# Patient Record
Sex: MALE | Race: BLACK OR AFRICAN AMERICAN | NOT HISPANIC OR LATINO | Employment: UNEMPLOYED | URBAN - METROPOLITAN AREA
[De-identification: names, ages, dates, MRNs, and addresses within clinical notes are randomized per-mention and may not be internally consistent; named-entity substitution may affect disease eponyms.]

---

## 2024-01-30 ENCOUNTER — APPOINTMENT (OUTPATIENT)
Dept: GENERAL RADIOLOGY | Facility: HOSPITAL | Age: 19
End: 2024-01-30
Payer: COMMERCIAL

## 2024-01-30 ENCOUNTER — HOSPITAL ENCOUNTER (EMERGENCY)
Facility: HOSPITAL | Age: 19
Discharge: HOME | End: 2024-01-30
Attending: EMERGENCY MEDICINE | Admitting: EMERGENCY MEDICINE
Payer: COMMERCIAL

## 2024-01-30 VITALS
HEART RATE: 105 BPM | OXYGEN SATURATION: 98 % | TEMPERATURE: 98.7 F | WEIGHT: 125 LBS | RESPIRATION RATE: 18 BRPM | BODY MASS INDEX: 17.9 KG/M2 | DIASTOLIC BLOOD PRESSURE: 67 MMHG | HEIGHT: 70 IN | SYSTOLIC BLOOD PRESSURE: 108 MMHG

## 2024-01-30 DIAGNOSIS — M79.671 BILATERAL FOOT PAIN: Primary | ICD-10-CM

## 2024-01-30 DIAGNOSIS — M79.672 BILATERAL FOOT PAIN: Primary | ICD-10-CM

## 2024-01-30 DIAGNOSIS — F31.9 BIPOLAR AFFECTIVE DISORDER, REMISSION STATUS UNSPECIFIED: ICD-10-CM

## 2024-01-30 LAB
ALBUMIN SERPL-MCNC: 4.1 G/DL (ref 3.5–5.2)
ALBUMIN/GLOB SERPL: 1.3 G/DL
ALP SERPL-CCNC: 60 U/L (ref 39–117)
ALT SERPL W P-5'-P-CCNC: 21 U/L (ref 1–41)
AMPHET+METHAMPHET UR QL: NEGATIVE
ANION GAP SERPL CALCULATED.3IONS-SCNC: 11.7 MMOL/L (ref 5–15)
AST SERPL-CCNC: 23 U/L (ref 1–40)
BARBITURATES UR QL SCN: NEGATIVE
BASOPHILS # BLD AUTO: 0.02 10*3/MM3 (ref 0–0.2)
BASOPHILS NFR BLD AUTO: 0.2 % (ref 0–1.5)
BENZODIAZ UR QL SCN: NEGATIVE
BILIRUB SERPL-MCNC: 0.3 MG/DL (ref 0–1.2)
BILIRUB UR QL STRIP: NEGATIVE
BUN SERPL-MCNC: 20 MG/DL (ref 6–20)
BUN/CREAT SERPL: 37.7 (ref 7–25)
CALCIUM SPEC-SCNC: 9.3 MG/DL (ref 8.6–10.5)
CANNABINOIDS SERPL QL: POSITIVE
CHLORIDE SERPL-SCNC: 99 MMOL/L (ref 98–107)
CK SERPL-CCNC: 158 U/L
CLARITY UR: ABNORMAL
CO2 SERPL-SCNC: 27.3 MMOL/L (ref 22–29)
COCAINE UR QL: NEGATIVE
COLOR UR: YELLOW
CREAT SERPL-MCNC: 0.53 MG/DL (ref 0.76–1.27)
DEPRECATED RDW RBC AUTO: 43.1 FL (ref 37–54)
EGFRCR SERPLBLD CKD-EPI 2021: 148.1 ML/MIN/1.73
EOSINOPHIL # BLD AUTO: 0.03 10*3/MM3 (ref 0–0.4)
EOSINOPHIL NFR BLD AUTO: 0.3 % (ref 0.3–6.2)
ERYTHROCYTE [DISTWIDTH] IN BLOOD BY AUTOMATED COUNT: 13.1 % (ref 12.3–15.4)
ETHANOL BLD-MCNC: <10 MG/DL (ref 0–10)
ETHANOL UR QL: <0.01 %
FENTANYL UR-MCNC: NEGATIVE NG/ML
GLOBULIN UR ELPH-MCNC: 3.2 GM/DL
GLUCOSE SERPL-MCNC: 93 MG/DL (ref 65–99)
GLUCOSE UR STRIP-MCNC: NEGATIVE MG/DL
HCT VFR BLD AUTO: 40.1 % (ref 37.5–51)
HGB BLD-MCNC: 13.4 G/DL (ref 13–17.7)
HGB UR QL STRIP.AUTO: NEGATIVE
IMM GRANULOCYTES # BLD AUTO: 0.13 10*3/MM3 (ref 0–0.05)
IMM GRANULOCYTES NFR BLD AUTO: 1.3 % (ref 0–0.5)
KETONES UR QL STRIP: NEGATIVE
LEUKOCYTE ESTERASE UR QL STRIP.AUTO: NEGATIVE
LYMPHOCYTES # BLD AUTO: 1.89 10*3/MM3 (ref 0.7–3.1)
LYMPHOCYTES NFR BLD AUTO: 18.4 % (ref 19.6–45.3)
MAGNESIUM SERPL-MCNC: 2.3 MG/DL (ref 1.7–2.2)
MCH RBC QN AUTO: 30 PG (ref 26.6–33)
MCHC RBC AUTO-ENTMCNC: 33.4 G/DL (ref 31.5–35.7)
MCV RBC AUTO: 89.9 FL (ref 79–97)
METHADONE UR QL SCN: NEGATIVE
MONOCYTES # BLD AUTO: 0.72 10*3/MM3 (ref 0.1–0.9)
MONOCYTES NFR BLD AUTO: 7 % (ref 5–12)
NEUTROPHILS NFR BLD AUTO: 7.49 10*3/MM3 (ref 1.7–7)
NEUTROPHILS NFR BLD AUTO: 72.8 % (ref 42.7–76)
NITRITE UR QL STRIP: NEGATIVE
NRBC BLD AUTO-RTO: 0 /100 WBC (ref 0–0.2)
NT-PROBNP SERPL-MCNC: 51.5 PG/ML (ref 0–450)
OPIATES UR QL: NEGATIVE
OXYCODONE UR QL SCN: NEGATIVE
PH UR STRIP.AUTO: 7 [PH] (ref 5–8)
PLATELET # BLD AUTO: 260 10*3/MM3 (ref 140–450)
PMV BLD AUTO: 8.1 FL (ref 6–12)
POTASSIUM SERPL-SCNC: 4.6 MMOL/L (ref 3.5–5.2)
PROT SERPL-MCNC: 7.3 G/DL (ref 6–8.5)
PROT UR QL STRIP: NEGATIVE
QT INTERVAL: 337 MS
QTC INTERVAL: 452 MS
RBC # BLD AUTO: 4.46 10*6/MM3 (ref 4.14–5.8)
SODIUM SERPL-SCNC: 138 MMOL/L (ref 136–145)
SP GR UR STRIP: 1.02 (ref 1–1.03)
UROBILINOGEN UR QL STRIP: ABNORMAL
VALPROATE SERPL-MCNC: 28 MCG/ML (ref 50–125)
WBC NRBC COR # BLD AUTO: 10.28 10*3/MM3 (ref 3.4–10.8)

## 2024-01-30 PROCEDURE — 80307 DRUG TEST PRSMV CHEM ANLYZR: CPT | Performed by: EMERGENCY MEDICINE

## 2024-01-30 PROCEDURE — 96375 TX/PRO/DX INJ NEW DRUG ADDON: CPT

## 2024-01-30 PROCEDURE — 82077 ASSAY SPEC XCP UR&BREATH IA: CPT | Performed by: EMERGENCY MEDICINE

## 2024-01-30 PROCEDURE — 71045 X-RAY EXAM CHEST 1 VIEW: CPT

## 2024-01-30 PROCEDURE — 25010000002 THIAMINE PER 100 MG: Performed by: EMERGENCY MEDICINE

## 2024-01-30 PROCEDURE — 99284 EMERGENCY DEPT VISIT MOD MDM: CPT

## 2024-01-30 PROCEDURE — 80053 COMPREHEN METABOLIC PANEL: CPT | Performed by: EMERGENCY MEDICINE

## 2024-01-30 PROCEDURE — 93005 ELECTROCARDIOGRAM TRACING: CPT | Performed by: EMERGENCY MEDICINE

## 2024-01-30 PROCEDURE — 82550 ASSAY OF CK (CPK): CPT | Performed by: EMERGENCY MEDICINE

## 2024-01-30 PROCEDURE — 96361 HYDRATE IV INFUSION ADD-ON: CPT

## 2024-01-30 PROCEDURE — 83880 ASSAY OF NATRIURETIC PEPTIDE: CPT | Performed by: EMERGENCY MEDICINE

## 2024-01-30 PROCEDURE — 83735 ASSAY OF MAGNESIUM: CPT | Performed by: EMERGENCY MEDICINE

## 2024-01-30 PROCEDURE — 96365 THER/PROPH/DIAG IV INF INIT: CPT

## 2024-01-30 PROCEDURE — 81003 URINALYSIS AUTO W/O SCOPE: CPT | Performed by: EMERGENCY MEDICINE

## 2024-01-30 PROCEDURE — 90791 PSYCH DIAGNOSTIC EVALUATION: CPT | Performed by: SOCIAL WORKER

## 2024-01-30 PROCEDURE — 80164 ASSAY DIPROPYLACETIC ACD TOT: CPT | Performed by: EMERGENCY MEDICINE

## 2024-01-30 PROCEDURE — 85025 COMPLETE CBC W/AUTO DIFF WBC: CPT | Performed by: EMERGENCY MEDICINE

## 2024-01-30 PROCEDURE — 25810000003 SODIUM CHLORIDE 0.9 % SOLUTION: Performed by: EMERGENCY MEDICINE

## 2024-01-30 RX ORDER — PALIPERIDONE 9 MG/1
9 TABLET, EXTENDED RELEASE ORAL EVERY EVENING
COMMUNITY

## 2024-01-30 RX ORDER — THIAMINE HYDROCHLORIDE 100 MG/ML
200 INJECTION, SOLUTION INTRAMUSCULAR; INTRAVENOUS ONCE
Status: COMPLETED | OUTPATIENT
Start: 2024-01-30 | End: 2024-01-30

## 2024-01-30 RX ORDER — METOPROLOL TARTRATE 50 MG/1
75 TABLET, FILM COATED ORAL 2 TIMES DAILY
COMMUNITY

## 2024-01-30 RX ORDER — SODIUM CHLORIDE 9 MG/ML
1000 INJECTION, SOLUTION INTRAVENOUS ONCE
Status: COMPLETED | OUTPATIENT
Start: 2024-01-30 | End: 2024-01-30

## 2024-01-30 RX ORDER — MULTIPLE VITAMINS W/ MINERALS TAB 9MG-400MCG
1 TAB ORAL ONCE
Status: COMPLETED | OUTPATIENT
Start: 2024-01-30 | End: 2024-01-30

## 2024-01-30 RX ORDER — DIVALPROEX SODIUM 500 MG/1
500 TABLET, DELAYED RELEASE ORAL 2 TIMES DAILY
COMMUNITY

## 2024-01-30 RX ORDER — PREDNISONE 5 MG/1
15 TABLET ORAL DAILY
COMMUNITY

## 2024-01-30 RX ORDER — CEPHALEXIN 500 MG/1
500 CAPSULE ORAL 2 TIMES DAILY
COMMUNITY

## 2024-01-30 RX ADMIN — THIAMINE HYDROCHLORIDE 200 MG: 100 INJECTION, SOLUTION INTRAMUSCULAR; INTRAVENOUS at 15:50

## 2024-01-30 RX ADMIN — FOLIC ACID 1 MG: 5 INJECTION, SOLUTION INTRAMUSCULAR; INTRAVENOUS; SUBCUTANEOUS at 15:54

## 2024-01-30 RX ADMIN — SODIUM CHLORIDE 1000 ML: 9 INJECTION, SOLUTION INTRAVENOUS at 15:50

## 2024-01-30 RX ADMIN — MULTIPLE VITAMINS W/ MINERALS TAB 1 TABLET: TAB at 15:50

## 2024-01-30 NOTE — ED NOTES
"[facility]. Caregiver that brought pt in states the pt needs a psych eval for \"talking out of his head\"  "

## 2024-01-30 NOTE — ED PROVIDER NOTES
EMERGENCY DEPARTMENT ENCOUNTER  Room Number:  16/16  Date of encounter:  1/30/2024  PCP: Provider, No Known  Patient Care Team:  Provider, No Known as PCP - General     HPI:  Context: Isa Benitez is a 18 y.o. male who presents to the ED c/o chief complaint of bilateral feet wounds and altered mental status.  Patient is a poor historian, history also supplied by patient's caregiver.  Patient was recently discharged from [facility], has been having swelling of his bilateral feet since discharge approximately week ago.  Patient denies any new injury.  Patient reports pain, worse with walking, reports difficulty making walk.  When asked why patient is on gabapentin patient reports that he believes it is for arthritis, denies any history of neuropathy.  Caregiver reports that patient has been confused, having difficulty taking care of himself, having hallucinations.  Patient reports that he has been compliant with his psychiatric medications.  Patient denies any recent alcohol or drug use.    MEDICAL HISTORY REVIEW  Reviewed in EPIC    PAST MEDICAL HISTORY  Active Ambulatory Problems     Diagnosis Date Noted    No Active Ambulatory Problems     Resolved Ambulatory Problems     Diagnosis Date Noted    No Resolved Ambulatory Problems     No Additional Past Medical History       PAST SURGICAL HISTORY  No past surgical history on file.    FAMILY HISTORY  No family history on file.    SOCIAL HISTORY  Social History     Socioeconomic History    Marital status: Single       ALLERGIES  Latex    The patient's allergies have been reviewed    REVIEW OF SYSTEMS  Unable to obtain review of systems secondary to poor historian  PHYSICAL EXAM  I have reviewed the triage vital signs and nursing notes.  ED Triage Vitals [01/30/24 1439]   Temp Heart Rate Resp BP SpO2   98.7 °F (37.1 °C) (!) 124 18 -- 96 %      Temp src Heart Rate Source Patient Position BP Location FiO2 (%)   Tympanic Monitor -- -- --       General:  No acute distress.  HENT: NCAT, PERRL, Nares patent.  Eyes: no scleral icterus.  Neck: trachea midline, no ROM limitations.  CV: regular rhythm, regular rate.  Respiratory: normal effort, CTAB.  Abdomen: soft, nondistended, NTTP, no rebound tenderness, no guarding or rigidity.  Musculoskeletal: no deformity.  Neuro: alert, moves all extremities, follows commands.  Skin: warm, dry.  Nonpitting edema bilateral feet, no cyanosis, no signs of infection.    LAB RESULTS  Recent Results (from the past 24 hour(s))   ECG 12 Lead Other; edema    Collection Time: 01/30/24  3:20 PM   Result Value Ref Range    QT Interval 337 ms    QTC Interval 452 ms   Comprehensive Metabolic Panel    Collection Time: 01/30/24  3:52 PM    Specimen: Blood   Result Value Ref Range    Glucose 93 65 - 99 mg/dL    BUN 20 6 - 20 mg/dL    Creatinine 0.53 (L) 0.76 - 1.27 mg/dL    Sodium 138 136 - 145 mmol/L    Potassium 4.6 3.5 - 5.2 mmol/L    Chloride 99 98 - 107 mmol/L    CO2 27.3 22.0 - 29.0 mmol/L    Calcium 9.3 8.6 - 10.5 mg/dL    Total Protein 7.3 6.0 - 8.5 g/dL    Albumin 4.1 3.5 - 5.2 g/dL    ALT (SGPT) 21 1 - 41 U/L    AST (SGOT) 23 1 - 40 U/L    Alkaline Phosphatase 60 39 - 117 U/L    Total Bilirubin 0.3 0.0 - 1.2 mg/dL    Globulin 3.2 gm/dL    A/G Ratio 1.3 g/dL    BUN/Creatinine Ratio 37.7 (H) 7.0 - 25.0    Anion Gap 11.7 5.0 - 15.0 mmol/L    eGFR 148.1 >60.0 mL/min/1.73   Ethanol    Collection Time: 01/30/24  3:52 PM    Specimen: Blood   Result Value Ref Range    Ethanol <10 0 - 10 mg/dL    Ethanol % <0.010 %   CK    Collection Time: 01/30/24  3:52 PM    Specimen: Blood   Result Value Ref Range    Creatine Kinase 158 U/L   BNP    Collection Time: 01/30/24  3:52 PM    Specimen: Blood   Result Value Ref Range    proBNP 51.5 0.0 - 450.0 pg/mL   Magnesium    Collection Time: 01/30/24  3:52 PM    Specimen: Blood   Result Value Ref Range    Magnesium 2.3 (H) 1.7 - 2.2 mg/dL   Valproic Acid Level, Total    Collection Time: 01/30/24  3:52 PM     Specimen: Blood   Result Value Ref Range    Valproic Acid 28.0 (L) 50.0 - 125.0 mcg/mL   CBC Auto Differential    Collection Time: 01/30/24  3:52 PM    Specimen: Blood   Result Value Ref Range    WBC 10.28 3.40 - 10.80 10*3/mm3    RBC 4.46 4.14 - 5.80 10*6/mm3    Hemoglobin 13.4 13.0 - 17.7 g/dL    Hematocrit 40.1 37.5 - 51.0 %    MCV 89.9 79.0 - 97.0 fL    MCH 30.0 26.6 - 33.0 pg    MCHC 33.4 31.5 - 35.7 g/dL    RDW 13.1 12.3 - 15.4 %    RDW-SD 43.1 37.0 - 54.0 fl    MPV 8.1 6.0 - 12.0 fL    Platelets 260 140 - 450 10*3/mm3    Neutrophil % 72.8 42.7 - 76.0 %    Lymphocyte % 18.4 (L) 19.6 - 45.3 %    Monocyte % 7.0 5.0 - 12.0 %    Eosinophil % 0.3 0.3 - 6.2 %    Basophil % 0.2 0.0 - 1.5 %    Immature Grans % 1.3 (H) 0.0 - 0.5 %    Neutrophils, Absolute 7.49 (H) 1.70 - 7.00 10*3/mm3    Lymphocytes, Absolute 1.89 0.70 - 3.10 10*3/mm3    Monocytes, Absolute 0.72 0.10 - 0.90 10*3/mm3    Eosinophils, Absolute 0.03 0.00 - 0.40 10*3/mm3    Basophils, Absolute 0.02 0.00 - 0.20 10*3/mm3    Immature Grans, Absolute 0.13 (H) 0.00 - 0.05 10*3/mm3    nRBC 0.0 0.0 - 0.2 /100 WBC   Urinalysis With Microscopic If Indicated (No Culture) - Urine, Clean Catch    Collection Time: 01/30/24  4:22 PM    Specimen: Urine, Clean Catch   Result Value Ref Range    Color, UA Yellow Yellow, Straw    Appearance, UA Turbid (A) Clear    pH, UA 7.0 5.0 - 8.0    Specific Gravity, UA 1.022 1.005 - 1.030    Glucose, UA Negative Negative    Ketones, UA Negative Negative    Bilirubin, UA Negative Negative    Blood, UA Negative Negative    Protein, UA Negative Negative    Leuk Esterase, UA Negative Negative    Nitrite, UA Negative Negative    Urobilinogen, UA 1.0 E.U./dL 0.2 - 1.0 E.U./dL   Urine Drug Screen - Urine, Clean Catch    Collection Time: 01/30/24  4:22 PM    Specimen: Urine, Clean Catch   Result Value Ref Range    Amphet/Methamphet, Screen Negative Negative    Barbiturates Screen, Urine Negative Negative    Benzodiazepine Screen, Urine Negative  Negative    Cocaine Screen, Urine Negative Negative    Opiate Screen Negative Negative    THC, Screen, Urine Positive (A) Negative    Methadone Screen, Urine Negative Negative    Oxycodone Screen, Urine Negative Negative    Fentanyl, Urine Negative Negative       I ordered the above labs and reviewed the results.    RADIOLOGY  XR Chest 1 View    Result Date: 1/30/2024  XR CHEST 1 VW-  INDICATION: Lower extremity edema  COMPARISON: None available      No focal consolidation. No pleural effusion or pneumothorax.  Normal size cardiomediastinal silhouette.  No focal osseous abnormality.   This report was finalized on 1/30/2024 3:43 PM by Dr. Willard Holcomb M.D on Workstation: [id number]       I ordered the above noted radiological studies. I reviewed the images and results. I agree with the radiologist interpretation.    PROCEDURES  Procedures    MEDICATIONS GIVEN IN ER  Medications   sodium chloride 0.9 % infusion 1,000 mL (1,000 mL Intravenous New Bag 1/30/24 1550)   folic acid 1 mg in sodium chloride 0.9 % 50 mL IVPB (0 mg Intravenous Stopped 1/30/24 1632)   thiamine (B-1) injection 200 mg (200 mg Intravenous Given 1/30/24 1550)   multivitamin with minerals 1 tablet (1 tablet Oral Given 1/30/24 1550)       PROGRESS, DATA ANALYSIS, CONSULTS, AND MEDICAL DECISION MAKING  A complete history and physical exam have been performed.  All available laboratory and imaging results have been reviewed by myself prior to disposition.    MDM    After the initial H&P, I discussed pertinent information from history and physical exam with patient/family.  Discussed differential diagnosis.  Discussed plan for ED evaluation/workup/treatment.  All questions answered.  Patient/family is agreeable with plan.  ED Course as of 01/30/24 1753   Tue Jan 30, 2024   1532 EKG independently viewed and contemporaneously interpreted by ED physician. Time: 1520.  Rate 108.  Interpretation: Sinus tachycardia, normal axis, normal QRS, benign early  repolarization. [JG]   1532 Reviewed chest x-ray in PACS, no pulmonary infiltrates per my read. [JG]   1622 Review of prior external notes (non-ED) -and- Review of prior external test results outside of this encounter:   Outside records obtained.  Patient was seen at [facility] on 24 January, complaining of bilateral lower extremity edema and pain for the last 2 months.  Patient with history of blood clots on Eliquis.  Patient had been diagnosed with pulmonary embolism on 1/5/2024, 2D echo performed showed EF of 74% without vegetation.  Patient has tested positive for multiple illicit substances on multiple visits.  Patient has a history of bipolar disorder and substance abuse. [JG]   1622 Review of prior external notes (non-ED) -and- Review of prior external test results outside of this encounter:   I reviewed patient's discharge summary from [facility].  Patient had been admitted to the hospital on January 27, 2023, history of polysubstance abuse and bipolar presented with bilateral foot pain as well as altered mental status.  Patient was admitted to inpatient psychiatry team, podiatry consulted, recommended nonoperative management, medicine evaluated for the same problem.  Patient noted to have persistent sinus tachycardia.  Patient had CT PE which showed a possible right lower lobe subsegmental PE, patient was treated with Eliquis.  MRI was obtained on the feet and patient was diagnosed with tenosynovitis, patient was treated with prednisone. [JG]   1644 ED lab work is unremarkable and reassuring.  Patient pending psychiatric evaluation. [JG]   1645 Patient has already had extensive evaluation of his feet, I have little additional to offer him.  Screening lab work obtained and unremarkable and reassuring.  Plan for psychiatric evaluation. [JG]   1645 Patient reassessed, discussed ED workup and results with patient and caregivers, discussed pending psychiatric  evaluation, no questions or concerns at present. [JG]   1716 Psychiatry currently at bedside. [JG]   1751 Patient evaluated by psychiatry.  Psychiatry states patient does not pose a danger to themselves or others, and is safe for discharge.  They recommend discharge with close outpatient follow-up.     [JG]      ED Course User Index  [JG] Freddy Quintanilla MD       AS OF 17:53 EST VITALS:    BP - 108/67  HR - 105  TEMP - 98.7 °F (37.1 °C) (Tympanic)  O2 SATS - 98%    DIAGNOSIS  Final diagnoses:   Bilateral foot pain   Bipolar affective disorder, remission status unspecified         DISPOSITION  DISCHARGE    Patient discharged in stable condition.    Reviewed implications of results, diagnosis, meds, responsibility to follow up, warning signs and symptoms of possible worsening, potential complications and reasons to return to ER.    Patient/Family voiced understanding of above instructions.    Discussed plan for discharge, as there is no emergent indication for admission. Patient referred to primary care provider for BP management due to today's BP. Pt/family is agreeable and understands need for follow up and repeat testing.  Pt is aware that discharge does not mean that nothing is wrong but it indicates no emergency is present that requires admission and they must continue care with follow-up as given below or physician of their choice.     FOLLOW-UP  Your PCP    Schedule an appointment as soon as possible for a visit in 2 days  even if well    PATIENT CONNECTION - [facility] [id number] [phone]    if you are unable to follow up with your PCP    [facility]  [address] [id number] [phone]  Go in 2 days  for further management of your psychiatric illness/substance abuse         Medication List      No changes were made to your prescriptions during this visit.            Freddy Quintanilla MD  01/30/24 9607

## 2024-01-30 NOTE — CONSULTS
"[facility] 18-year-old for altered mental status.  Patient was pleasant and cooperative.  Access talked to patient's  who stated that patient seemed \"a little more disoriented today.  The  also stated the patient had been having some visual hallucinations of people.  Patient was just in [facility] psychiatric unit and released in the last day or 2.  Patient is on Depakote and Invega and has recently started prednisone among other medications he has been on.  Patient has inflammation in his feet that are being treated with prednisone.  Patient struggles to walk as it is painful but is able to do it slowly and with the help of a walker.  Patient has a diagnosis of bipolar disorder.  Patient's  Jerald Rubin stated that patient is \"elusive\" but will show up to [facility] as it is a day program for some assistance.  Otherwise patient is homeless.  It is unsure how long patient has been homeless as he is only 18 years old.  [facility] staff have been unable to connect with any family members for patient since they have known him.  Patient denied any SI and states he had an attempt 1 year ago.  Patient  states that he has been to [facility] in the past.  Patient was accompanied by a director for the [facility] who has been trying to find a place for patient to get some respite care.  The staff of [facility] has been trying to find placement such as [facility] or [facility] to help patient but he will not be accepted there due to not being able to take care of his ADLs like he should.  The staff at a [facility] feel like patient is not able to care for himself currently.  Patient has been worked up in the ED and his lab work was not unusual.  Patient states his feet were feeling a little bit better than yesterday.  [facility] staff states that patient can walk with a walker and short steps.  Accompanying [facility] staff stated that they were looking at nursing " rehab place in [place] but they are full.    Patient does not meet criteria for inpatient psychiatric care.  [facility] staff states that [facility]'s respite care would not accept him at this time as well.  Patient was positive for marijuana and has had positive UDS for multiple substances in the past according to the ED doctors review of the medical record.  Patient was also at [facility] on January 24.  The [facility] staff with patient stated they will continue to try to find a place for patient and will help to set him up with outpatient psychiatric follow-up through [facility].  ED doctor okay with discharge plan.